# Patient Record
Sex: FEMALE | Race: WHITE | NOT HISPANIC OR LATINO | Employment: UNEMPLOYED | URBAN - METROPOLITAN AREA
[De-identification: names, ages, dates, MRNs, and addresses within clinical notes are randomized per-mention and may not be internally consistent; named-entity substitution may affect disease eponyms.]

---

## 2021-02-27 ENCOUNTER — HOSPITAL ENCOUNTER (EMERGENCY)
Facility: MEDICAL CENTER | Age: 26
End: 2021-02-27
Attending: EMERGENCY MEDICINE
Payer: COMMERCIAL

## 2021-02-27 ENCOUNTER — APPOINTMENT (OUTPATIENT)
Dept: RADIOLOGY | Facility: MEDICAL CENTER | Age: 26
End: 2021-02-27
Attending: EMERGENCY MEDICINE
Payer: COMMERCIAL

## 2021-02-27 VITALS
WEIGHT: 138.89 LBS | HEART RATE: 80 BPM | SYSTOLIC BLOOD PRESSURE: 148 MMHG | HEIGHT: 65 IN | TEMPERATURE: 96.9 F | OXYGEN SATURATION: 98 % | RESPIRATION RATE: 18 BRPM | BODY MASS INDEX: 23.14 KG/M2 | DIASTOLIC BLOOD PRESSURE: 94 MMHG

## 2021-02-27 DIAGNOSIS — S90.31XA CONTUSION OF RIGHT FOOT, INITIAL ENCOUNTER: ICD-10-CM

## 2021-02-27 PROCEDURE — 302874 HCHG BANDAGE ACE 2 OR 3""

## 2021-02-27 PROCEDURE — 73630 X-RAY EXAM OF FOOT: CPT | Mod: RT

## 2021-02-27 PROCEDURE — 99283 EMERGENCY DEPT VISIT LOW MDM: CPT

## 2021-02-27 ASSESSMENT — ENCOUNTER SYMPTOMS
DIZZINESS: 0
COUGH: 0
ABDOMINAL PAIN: 0
NECK PAIN: 0
CHILLS: 0
HEADACHES: 0
FEVER: 0
BACK PAIN: 0
SHORTNESS OF BREATH: 0

## 2021-02-27 NOTE — ED TRIAGE NOTES
Chief Complaint   Patient presents with   • Foot Pain     Right       Pt to triage via wheelchair c/o R foot pain after hitting it against a metal fence.  CMS intact    Pt & staff masked and in appropriate PPE during encounter.  Pt denies fever/travel or being in contact with anyone testing positive for Covid.  Explained pt the triage process, made pt aware to tell the RN/staff of any changes/concerns, pt verbalized understanding of process and instructions given.  Pt to ER lobby.

## 2021-02-27 NOTE — ED PROVIDER NOTES
ED Provider Note    CHIEF COMPLAINT  Chief Complaint   Patient presents with   • Foot Pain     Right       HPI  Latonya Acevedo is a 25 y.o. female with no significant medical history presented to the emergency department for evaluation of right foot pain.  Patient reports that she was attempting to break up dogs fighting when she ran her foot into a metal fence.  She struck the top of her foot on the fence causing immediate pain.  She reports that she is able to walk though it is quite painful.  She denies any numbness or tingling of the toes.  When she noted the swelling she came into the emergency department for further evaluation.  Event occurred just prior to arrival.  She did not take any medications.  She reports that the pain is 5/10, but worse with palpation of the area.    REVIEW OF SYSTEMS  Review of Systems   Constitutional: Negative for chills and fever.   Respiratory: Negative for cough and shortness of breath.    Cardiovascular: Negative for chest pain.   Gastrointestinal: Negative for abdominal pain.   Musculoskeletal: Negative for back pain and neck pain.        + right foot pain   Skin:        Negative for wound   Neurological: Negative for dizziness and headaches.   Endo/Heme/Allergies: Negative.        PAST MEDICAL HISTORY       SOCIAL HISTORY  Social History     Tobacco Use   • Smoking status: Current Every Day Smoker     Packs/day: 0.03     Types: Cigarettes   • Smokeless tobacco: Never Used   Substance and Sexual Activity   • Alcohol use: No   • Drug use: No   • Sexual activity: Yes     Comment: none       SURGICAL HISTORY   has a past surgical history that includes ercp in or (1/14/2015) and wilfredo by laparoscopy (1/13/2015).    CURRENT MEDICATIONS  Home Medications    **Home medications have not yet been reviewed for this encounter**         ALLERGIES  Allergies   Allergen Reactions   • Neosporin [Neomycin-Bacitracin-Polymyxin] Rash and Itching              PHYSICAL EXAM  VITAL SIGNS: BP  "152/102   Pulse 83   Temp 36.1 °C (96.9 °F) (Temporal)   Resp 18   Ht 1.651 m (5' 5\")   Wt 63 kg (138 lb 14.2 oz)   SpO2 97%   BMI 23.11 kg/m²    Pulse ox interpretation: I interpret this pulse ox as normal.    Physical Exam   Constitutional: She is oriented to person, place, and time and well-developed, well-nourished, and in no distress.   HENT:   Head: Normocephalic and atraumatic.   Mouth/Throat: Oropharynx is clear and moist.   Eyes: Conjunctivae are normal.   Cardiovascular: Normal rate, regular rhythm and intact distal pulses.   Pulmonary/Chest: Effort normal and breath sounds normal. No respiratory distress. She has no rales.   Musculoskeletal:         General: Normal range of motion.      Cervical back: Neck supple.      Right ankle: Swelling and ecchymosis present.      Comments: Dorsum of right foot with edema and tenderness present. Linear ecchymosis present transversely on the foot. Tender to palpation. Neurovascularly intact.   Neurological: She is alert and oriented to person, place, and time. GCS score is 15.   Skin: Skin is warm and dry.   Psychiatric: Affect and judgment normal.   Nursing note and vitals reviewed.        DIAGNOSTIC STUDIES    DX-FOOT-COMPLETE 3+ RIGHT   Final Result      No acute osseous abnormality.            COURSE & MEDICAL DECISION MAKING  Pertinent Labs & Imaging studies reviewed. (See chart for details)  I verified that the patient was wearing a mask and I was wearing appropriate PPE every time I entered the room. The patient's mask was on the patient at all times during my encounter except for a brief view of the oropharynx.     25-year-old female presents emergency department for evaluation of right foot pain after accidentally hitting her foot on a fence this evening.  On my examination she did have significant swelling and tenderness over the dorsum of the foot concerning for fracture.  X-ray was obtained showing no acute osseous abnormality.  Patient's foot was " wrapped with a compressive bandage and I advised RICE therapy for continued care.    The patient will return for new or worsening symptoms and is stable at the time of discharge.    The patient is referred to a primary physician for blood pressure management, diabetic screening, and for all other preventative health concerns.      DISPOSITION:  Patient will be discharged home in stable condition.    FOLLOW UP:  [facility], Emergency Dept  [facility] [facility] [id number] [phone]    As needed      OUTPATIENT MEDICATIONS:  Discharge Medication List as of 2/27/2021  2:21 AM          FINAL IMPRESSION  Visit Diagnoses     ICD-10-CM   1. Contusion of right foot, initial encounter  S90.31XA              Electronically signed by: Nara Parham M.D., 2/27/2021 1:18 AM

## 2021-08-06 PROCEDURE — 99284 EMERGENCY DEPT VISIT MOD MDM: CPT

## 2021-08-07 ENCOUNTER — HOSPITAL ENCOUNTER (EMERGENCY)
Facility: MEDICAL CENTER | Age: 26
End: 2021-08-07
Attending: EMERGENCY MEDICINE

## 2021-08-07 VITALS
OXYGEN SATURATION: 98 % | BODY MASS INDEX: 22.99 KG/M2 | SYSTOLIC BLOOD PRESSURE: 112 MMHG | WEIGHT: 138 LBS | DIASTOLIC BLOOD PRESSURE: 70 MMHG | TEMPERATURE: 98.6 F | HEIGHT: 65 IN | RESPIRATION RATE: 16 BRPM | HEART RATE: 87 BPM

## 2021-08-07 DIAGNOSIS — Z20.822 SUSPECTED COVID-19 VIRUS INFECTION: ICD-10-CM

## 2021-08-07 DIAGNOSIS — N12 PYELONEPHRITIS: ICD-10-CM

## 2021-08-07 LAB
ALBUMIN SERPL BCP-MCNC: 3.6 G/DL (ref 3.2–4.9)
ALBUMIN/GLOB SERPL: 1 G/DL
ALP SERPL-CCNC: 135 U/L (ref 30–99)
ALT SERPL-CCNC: 36 U/L (ref 2–50)
ANION GAP SERPL CALC-SCNC: 17 MMOL/L (ref 7–16)
APPEARANCE UR: CLEAR
AST SERPL-CCNC: 31 U/L (ref 12–45)
BACTERIA #/AREA URNS HPF: ABNORMAL /HPF
BASOPHILS # BLD AUTO: 0 % (ref 0–1.8)
BASOPHILS # BLD: 0 K/UL (ref 0–0.12)
BILIRUB SERPL-MCNC: 1 MG/DL (ref 0.1–1.5)
BILIRUB UR QL STRIP.AUTO: ABNORMAL
BUN SERPL-MCNC: 6 MG/DL (ref 8–22)
CALCIUM SERPL-MCNC: 9.4 MG/DL (ref 8.5–10.5)
CHLORIDE SERPL-SCNC: 91 MMOL/L (ref 96–112)
CO2 SERPL-SCNC: 24 MMOL/L (ref 20–33)
COLOR UR: YELLOW
CREAT SERPL-MCNC: 1.02 MG/DL (ref 0.5–1.4)
DOHLE BOD BLD QL SMEAR: NORMAL
EOSINOPHIL # BLD AUTO: 0 K/UL (ref 0–0.51)
EOSINOPHIL NFR BLD: 0 % (ref 0–6.9)
EPI CELLS #/AREA URNS HPF: ABNORMAL /HPF
ERYTHROCYTE [DISTWIDTH] IN BLOOD BY AUTOMATED COUNT: 39.5 FL (ref 35.9–50)
FLUAV RNA SPEC QL NAA+PROBE: NEGATIVE
FLUBV RNA SPEC QL NAA+PROBE: NEGATIVE
GLOBULIN SER CALC-MCNC: 3.6 G/DL (ref 1.9–3.5)
GLUCOSE SERPL-MCNC: 107 MG/DL (ref 65–99)
GLUCOSE UR STRIP.AUTO-MCNC: NEGATIVE MG/DL
HCG SERPL QL: NEGATIVE
HCT VFR BLD AUTO: 46.4 % (ref 37–47)
HGB BLD-MCNC: 16.1 G/DL (ref 12–16)
KETONES UR STRIP.AUTO-MCNC: 15 MG/DL
LEUKOCYTE ESTERASE UR QL STRIP.AUTO: ABNORMAL
LIPASE SERPL-CCNC: 23 U/L (ref 11–82)
LYMPHOCYTES # BLD AUTO: 1.57 K/UL (ref 1–4.8)
LYMPHOCYTES NFR BLD: 11.3 % (ref 22–41)
MANUAL DIFF BLD: NORMAL
MCH RBC QN AUTO: 31.5 PG (ref 27–33)
MCHC RBC AUTO-ENTMCNC: 34.7 G/DL (ref 33.6–35)
MCV RBC AUTO: 90.8 FL (ref 81.4–97.8)
MICRO URNS: ABNORMAL
MONOCYTES # BLD AUTO: 2.17 K/UL (ref 0–0.85)
MONOCYTES NFR BLD AUTO: 15.6 % (ref 0–13.4)
MORPHOLOGY BLD-IMP: NORMAL
NEUTROPHILS # BLD AUTO: 10.16 K/UL (ref 2–7.15)
NEUTROPHILS NFR BLD: 72.2 % (ref 44–72)
NEUTS BAND NFR BLD MANUAL: 0.9 % (ref 0–10)
NITRITE UR QL STRIP.AUTO: POSITIVE
NRBC # BLD AUTO: 0 K/UL
NRBC BLD-RTO: 0 /100 WBC
PH UR STRIP.AUTO: 5.5 [PH] (ref 5–8)
PLATELET # BLD AUTO: 302 K/UL (ref 164–446)
PLATELET BLD QL SMEAR: NORMAL
PMV BLD AUTO: 10.2 FL (ref 9–12.9)
POTASSIUM SERPL-SCNC: 3 MMOL/L (ref 3.6–5.5)
PROT SERPL-MCNC: 7.2 G/DL (ref 6–8.2)
PROT UR QL STRIP: 100 MG/DL
RBC # BLD AUTO: 5.11 M/UL (ref 4.2–5.4)
RBC # URNS HPF: ABNORMAL /HPF
RBC BLD AUTO: PRESENT
RBC UR QL AUTO: ABNORMAL
RENAL EPI CELLS #/AREA URNS HPF: ABNORMAL /HPF
RSV RNA SPEC QL NAA+PROBE: NEGATIVE
SARS-COV-2 RNA RESP QL NAA+PROBE: NOTDETECTED
SODIUM SERPL-SCNC: 132 MMOL/L (ref 135–145)
SP GR UR STRIP.AUTO: 1.02
SPECIMEN SOURCE: NORMAL
TOXIC GRANULES BLD QL SMEAR: NORMAL
TRANS CELLS #/AREA URNS HPF: NEGATIVE /HPF
UROBILINOGEN UR STRIP.AUTO-MCNC: 1 MG/DL
WBC # BLD AUTO: 13.9 K/UL (ref 4.8–10.8)
WBC #/AREA URNS HPF: ABNORMAL /HPF

## 2021-08-07 PROCEDURE — 84703 CHORIONIC GONADOTROPIN ASSAY: CPT

## 2021-08-07 PROCEDURE — 81001 URINALYSIS AUTO W/SCOPE: CPT

## 2021-08-07 PROCEDURE — 85027 COMPLETE CBC AUTOMATED: CPT

## 2021-08-07 PROCEDURE — 83690 ASSAY OF LIPASE: CPT

## 2021-08-07 PROCEDURE — 87086 URINE CULTURE/COLONY COUNT: CPT

## 2021-08-07 PROCEDURE — 700111 HCHG RX REV CODE 636 W/ 250 OVERRIDE (IP): Performed by: EMERGENCY MEDICINE

## 2021-08-07 PROCEDURE — 80053 COMPREHEN METABOLIC PANEL: CPT

## 2021-08-07 PROCEDURE — 85007 BL SMEAR W/DIFF WBC COUNT: CPT

## 2021-08-07 PROCEDURE — 87186 SC STD MICRODIL/AGAR DIL: CPT

## 2021-08-07 PROCEDURE — 700105 HCHG RX REV CODE 258: Performed by: EMERGENCY MEDICINE

## 2021-08-07 PROCEDURE — 96374 THER/PROPH/DIAG INJ IV PUSH: CPT

## 2021-08-07 PROCEDURE — 87077 CULTURE AEROBIC IDENTIFY: CPT

## 2021-08-07 PROCEDURE — 0241U HCHG SARS-COV-2 COVID-19 NFCT DS RESP RNA 4 TRGT MIC: CPT

## 2021-08-07 RX ORDER — ONDANSETRON 2 MG/ML
4 INJECTION INTRAMUSCULAR; INTRAVENOUS ONCE
Status: DISCONTINUED | OUTPATIENT
Start: 2021-08-07 | End: 2021-08-07 | Stop reason: HOSPADM

## 2021-08-07 RX ORDER — CEFDINIR 300 MG/1
300 CAPSULE ORAL 2 TIMES DAILY
Qty: 20 CAPSULE | Refills: 0 | Status: SHIPPED | OUTPATIENT
Start: 2021-08-07 | End: 2024-02-18

## 2021-08-07 RX ORDER — ONDANSETRON 4 MG/1
4 TABLET, ORALLY DISINTEGRATING ORAL EVERY 6 HOURS PRN
Qty: 10 TABLET | Refills: 0 | Status: SHIPPED | OUTPATIENT
Start: 2021-08-07 | End: 2021-08-12

## 2021-08-07 RX ORDER — SODIUM CHLORIDE 9 MG/ML
1000 INJECTION, SOLUTION INTRAVENOUS ONCE
Status: COMPLETED | OUTPATIENT
Start: 2021-08-07 | End: 2021-08-07

## 2021-08-07 RX ADMIN — SODIUM CHLORIDE 1000 ML: 9 INJECTION, SOLUTION INTRAVENOUS at 01:45

## 2021-08-07 RX ADMIN — CEFTRIAXONE SODIUM 1 G: 10 INJECTION, POWDER, FOR SOLUTION INTRAVENOUS at 03:07

## 2021-08-07 ASSESSMENT — PAIN DESCRIPTION - PAIN TYPE: TYPE: ACUTE PAIN

## 2021-08-07 NOTE — DISCHARGE INSTRUCTIONS
Your test results:  You have been tested for COVID-19 today. Your results are pending. Please act as if these results are positive and self isolate until you receive the results. Make sure you still wear a mask, social distance and practice good hand hygiene no matterthe result. In order to receive the results you will need to log into your mychart on the [employer] website. If you do not have an account you can create an account. You can login or create an account for [employer] at [email or web address].  Quarantine Criteria:  If your COVID test is positive self isolation at home is required.  Per the CDC guidelines, you must quarantine at home until the following conditions have been met:  It has been 10 days since the onset of symptoms  You have been fever free for 24 hours  Your symptoms are improving.     Self Care:  You need to get plenty of rest.   You should take Tylenol as needed for fever and body aches  Drink plenty of fluids and have good nutrition  Take over-the-counter immune supplements including: Vitamin C 500 mg twice a day, Zinc 75 mg daily, Vitamin D3 1000 U daily and melotonin 0.3 - 1 mg at night to help you sleep.      Community Care:  If you have no choice and have to go out to get medications or food, please wear a mask and wash your hands frequently.    Please tell everyone you know to wear a mask when in public to help decrease transmission of the virus.  Per CDC guidelines, you are not required to provide a healthcare provider’s note to validate your illness or to return to work, as healthcare provider offices and medical facilities may be extremely busy and not able to provide such documentation in a timely way.    Return to the ER Precautions:  Return to the ED if the is any significant shortness of breath, worsening symptoms, not improving as expected or you develop any concerning symptoms.   If your symptoms worsen to a point that you become so short of breath that you can only walk very short  distances prior to fatigue or feel you were unable to manage your symptoms at home please return to the emergency department. For a more objective approach you can buy a pulse oximeter online. [employer] has multiple to choose from. If your oxygen saturation in these devices is persistently below 90% please return to the ER.

## 2021-08-07 NOTE — LETTER
8/10/2021               Latonya Acevedo  [address] [place] [address]        Dear Latonya (MR#[id number])    This letter is sent in regards to your recent visit to the [facility] Emergency Department on 8/6/2021. During the visit, tests were performed to assist the physician in your medical diagnosis. A review of your tests requires that we notify you of the following:    Your urine culture was POSITIVE for a bacteria called Escherichia coli. The antibiotic prescribed for you (cefdinir) should be active to treat this bacteria. It is important that you continue taking your antibiotic until it is finished.     Please feel free to contact me at the number below if you have any questions or concerns. Thank you for your cooperation in the matter.    Sincerely,  ED Culture Follow-Up Staff  Brenda Curry, PharmD    [facility], Emergency Department  [address] [place] [id number] [phone] (Brenda's phone number)  [phone] (ED Culture Line)

## 2021-08-07 NOTE — ED TRIAGE NOTES
Chief Complaint   Patient presents with   • Fever   • Weakness   • N/V   • Diarrhea     Pt walked into triage c/o fever, chills, weakness with N/V/D x1 week.  No otc meds prior to arrival, no Covid Vaccine    PPE Note: I personally donned full PPE for all patient encounters during this visit, including a N95 respirator mask, gloves, and goggles.

## 2021-08-07 NOTE — ED PROVIDER NOTES
ER Provider Note     Scribed for Josesito Toure M.D. by Rupal Mendiola. 8/7/2021, 1:15 AM.    Primary Care Provider: Pcp Pt States None  Means of Arrival: Walk-in   History obtained from: Patient  History limited by: None     CHIEF COMPLAINT  Chief Complaint   Patient presents with    Fever    Weakness    N/V    Diarrhea       HPI  Latonya Acevedo is a 25 y.o. female who presents to the Emergency Department for evaluation of acute vomiting onset a week prior to arrival. She states that she has had persistent nausea and is unable to hold anything down. She endorses associated diarrhea, generalized weakness, fever, cold sweats, and fatigue. She is not vaccinated against COVID-19. There are no known alleviating or exacerbating factors.     REVIEW OF SYSTEMS  See HPI for further details. All other systems are negative.     PAST MEDICAL HISTORY       SURGICAL HISTORY   has a past surgical history that includes ercp in or (1/14/2015) and wilfredo by laparoscopy (1/13/2015).    SOCIAL HISTORY  Social History     Tobacco Use    Smoking status: Current Every Day Smoker     Packs/day: 0.03     Types: Cigarettes    Smokeless tobacco: Never Used   Substance Use Topics    Alcohol use: No    Drug use: No      Social History     Substance and Sexual Activity   Drug Use No       FAMILY HISTORY  Family History   Problem Relation Age of Onset    Asthma Father     Cancer Maternal Grandmother         breast cancer    Cancer Maternal Grandfather         lung Cancer    Asthma Paternal Grandfather     Heart Attack Paternal Grandfather        CURRENT MEDICATIONS  Current Outpatient Medications   Medication Instructions    ibuprofen (MOTRIN) 400 mg, Oral, EVERY 6 HOURS PRN    sucralfate (CARAFATE) 1 g, Oral, 4 TIMES DAILY - BEFORE MEALS , NIGHTLY       ALLERGIES  Allergies   Allergen Reactions    Neosporin [Neomycin-Bacitracin-Polymyxin] Rash and Itching              PHYSICAL EXAM  VITAL SIGNS: /68   Pulse 92   Temp 37 °C (98.6  "°F) (Oral)   Resp 16   Ht 1.651 m (5' 5\")   Wt 62.6 kg (138 lb)   SpO2 95%   BMI 22.96 kg/m²      Constitutional: Alert in no apparent distress.  HENT: No signs of trauma, Bilateral external ears normal, Nose normal.   Eyes: Pupils are equal and reactive, Conjunctiva normal, Non-icteric.   Neck: Normal range of motion, No tenderness, Supple, No stridor.   Lymphatic: No lymphadenopathy noted.   Cardiovascular: Regular rate and rhythm, no palpable thrill  Thorax & Lungs: No respiratory distress,  No chest tenderness.   Abdomen: Bowel sounds normal, Soft, No tenderness, No masses, No pulsatile masses. No peritoneal signs.  Skin: Warm, Dry, No erythema, No rash.   Back: No bony tenderness, No CVA tenderness.   Extremities: Intact distal pulses, No edema, No tenderness, No cyanosis.  Musculoskeletal: Good range of motion in all major joints. No tenderness to palpation or major deformities noted.   Neurologic: Alert , Normal motor function, Normal sensory function, No focal deficits noted.   Psychiatric: Affect normal, Judgment normal, Mood normal.     DIAGNOSTIC STUDIES / PROCEDURES    LABS  Labs Reviewed   CBC WITH DIFFERENTIAL - Abnormal; Notable for the following components:       Result Value    WBC 13.9 (*)     Hemoglobin 16.1 (*)     Neutrophils-Polys 72.20 (*)     Lymphocytes 11.30 (*)     Monocytes 15.60 (*)     Neutrophils (Absolute) 10.16 (*)     Monos (Absolute) 2.17 (*)     All other components within normal limits   COMP METABOLIC PANEL - Abnormal; Notable for the following components:    Sodium 132 (*)     Potassium 3.0 (*)     Chloride 91 (*)     Anion Gap 17.0 (*)     Glucose 107 (*)     Bun 6 (*)     Alkaline Phosphatase 135 (*)     Globulin 3.6 (*)     All other components within normal limits   URINALYSIS,CULTURE IF INDICATED - Abnormal; Notable for the following components:    Ketones 15 (*)     Protein 100 (*)     Bilirubin Moderate (*)     Nitrite Positive (*)     Leukocyte Esterase Moderate " (*)     Occult Blood Moderate (*)     All other components within normal limits    Narrative:     Indication for culture:->Patient WITHOUT an indwelling Jefferson  catheter in place with new onset of Dysuria, Frequency,  Urgency, and/or Suprapubic pain   URINE MICROSCOPIC (W/UA) - Abnormal; Notable for the following components:    WBC  (*)     RBC 5-10 (*)     Bacteria Moderate (*)     Epithelial Cells Moderate (*)     All other components within normal limits    Narrative:     Indication for culture:->Patient WITHOUT an indwelling Jefferson  catheter in place with new onset of Dysuria, Frequency,  Urgency, and/or Suprapubic pain   LIPASE   HCG QUAL SERUM   COV-2, FLU A/B, AND RSV BY PCR    Narrative:     Have you been in close contact with a person who is suspected  or known to be positive for COVID-19 within the last 30 days  (e.g. last seen that person < 30 days ago)->Unknown   URINE CULTURE(NEW)    Narrative:     Indication for culture:->Patient WITHOUT an indwelling Jefferson  catheter in place with new onset of Dysuria, Frequency,  Urgency, and/or Suprapubic pain   ESTIMATED GFR   DIFFERENTIAL MANUAL   PERIPHERAL SMEAR REVIEW   PLATELET ESTIMATE   MORPHOLOGY     All labs reviewed by me.    COURSE & MEDICAL DECISION MAKING  Pertinent Labs & Imaging studies reviewed. (See chart for details)    This is a 25 y.o. female that presents with nausea as well as vomiting and generally feeling unwell.  Concern about urinary tract infection versus pancreatitis versus hepatitis versus pregnancy and will reassess after the below labs with been performed..     1:15 AM - Patient seen and examined at bedside. Ordered CBC with differential, CMP, Lipase, HCG Qual Serum, and UA. Patient will be treated with IV fluids and Zofran for her symptoms.    2:24 AM - Patient treated with Rocephin.    2:51 AM - Patient has pyelonephritis and suspected COVID-19. She will be prescribed Zofran and Omnicef and discharged in stable condition.    The  patient will return for new or worsening symptoms and is stable at the time of discharge.    The patient is referred to a primary physician for blood pressure management, diabetic screening, and for all other preventative health concerns.    Patient was found to have a UTI.  Given the constellation of symptoms I believe this is likely pyelonephritis.  I will treat her with ceftriaxone here in the emergency department she does have a leukocytosis.  She does have an anion gap of 17.  I will give her nausea medication.  The patient is not pregnant and has a negative lipase.  We will discharge the patient home with strict return precautions and follow-up.    DISPOSITION:  Patient will be discharged home in stable condition.    FOLLOW UP:  [address] [id number] [phone]  Go in 2 days      OUTPATIENT MEDICATIONS:  Discharge Medication List as of 8/7/2021  2:52 AM        START taking these medications    Details   cefdinir (OMNICEF) 300 MG Cap Take 1 capsule by mouth 2 times a day., Disp-20 capsule, R-0, Normal      ondansetron (ZOFRAN ODT) 4 MG TABLET DISPERSIBLE Take 1 tablet by mouth every 6 hours as needed for Nausea for up to 5 days., Disp-10 tablet, R-0, Normal             FINAL IMPRESSION  1. Pyelonephritis    2. Suspected COVID-19 virus infection          Rupal BATRES (Anam), am scribing for, and in the presence of, Josesito Toure M.D..    Electronically signed by: Rupal Nicolas), 8/7/2021    IJosesito M.D. personally performed the services described in this documentation, as scribed by Rupal Mendiola in my presence, and it is both accurate and complete.     The note accurately reflects work and decisions made by me.  Josesito Toure M.D.  8/7/2021  4:10 AM

## 2021-08-09 LAB
BACTERIA UR CULT: ABNORMAL
BACTERIA UR CULT: ABNORMAL
SIGNIFICANT IND 70042: ABNORMAL
SITE SITE: ABNORMAL
SOURCE SOURCE: ABNORMAL

## 2021-08-11 NOTE — ED NOTES
"ED Positive Culture Follow-up/Notification Note:    Date: 8/10/2021     Patient seen in the ED on 8/6/2021 for fever, N/V, and diarrhea. Patient received ceftriaxone 1 g IV x1 in the ED.  1. Pyelonephritis    2. Suspected COVID-19 virus infection       Discharge Medication List as of 8/7/2021  2:52 AM      START taking these medications    Details   cefdinir (OMNICEF) 300 MG Cap Take 1 capsule by mouth 2 times a day., Disp-20 capsule, R-0, Normal      ondansetron (ZOFRAN ODT) 4 MG TABLET DISPERSIBLE Take 1 tablet by mouth every 6 hours as needed for Nausea for up to 5 days., Disp-10 tablet, R-0, Normal             Allergies: Neosporin [neomycin-bacitracin-polymyxin]     Vitals:    08/06/21 2227 08/06/21 2230 08/07/21 0100 08/07/21 0254   BP: 115/77  104/68 112/70   Pulse: (!) 113  92 87   Resp: 16   16   Temp: 37 °C (98.6 °F)      TempSrc: Oral      SpO2: 95%  95% 98%   Weight:  62.6 kg (138 lb)     Height: 1.651 m (5' 5\") 1.651 m (5' 5\")         Final cultures:   Results     Procedure Component Value Units Date/Time    URINE CULTURE(NEW) [[id number]]  (Abnormal)  (Susceptibility) Collected: 08/07/21 0141    Order Status: Completed Specimen: Urine Updated: 08/09/21 1035     Significant Indicator POS     Source UR     Site -     Culture Result Usual skin kyra 10-50,000 cfu/mL      Escherichia coli  >100,000 cfu/mL      Narrative:      Indication for culture:->Patient WITHOUT an indwelling Jefferson  catheter in place with new onset of Dysuria, Frequency,  Urgency, and/or Suprapubic pain    Susceptibility     Escherichia coli (1)     Antibiotic Interpretation Microscan Method Status    Amikacin  [*]  Sensitive <=16 mcg/mL JD Final    Ampicillin Resistant >16 mcg/mL JD Final    Amoxicillin/CA  [*]  Sensitive <=8/4 mcg/mL JD Final    Aztreonam  [*]  Sensitive <=4 mcg/mL JD Final    Ceftolozane+Tazobactam  [*]  Sensitive <=2 mcg/mL JD Final    Ceftriaxone Sensitive <=1 mcg/mL JD Final    Ceftazidime  [*]  Sensitive <=1 " "mcg/mL JD Final    Cefazolin Sensitive <=2 mcg/mL JD Final    Ciprofloxacin Sensitive <=0.25 mcg/mL JD Final    Cefepime Sensitive <=2 mcg/mL JD Final    Cefuroxime Sensitive <=4 mcg/mL JD Final    Ampicillin/sulbactam Intermediate 16/8 mcg/mL JD Final    Ceftazidime+Avibactam  [*]  Sensitive <=4 mcg/mL JD Final    Tobramycin Sensitive <=2 mcg/mL JD Final    Ertapenem  [*]  Sensitive <=0.5 mcg/mL JD Final    Nitrofurantoin Sensitive <=32 mcg/mL JD Final    Gentamicin Sensitive <=2 mcg/mL JD Final    Imipenem  [*]  Sensitive <=1 mcg/mL JD Final    Levofloxacin Sensitive <=0.5 mcg/mL JD Final    Meropenem  [*]  Sensitive <=1 mcg/mL JD Final    Meropenem/Vaborbactam  [*]  Sensitive <=2 mcg/mL JD Final    Pip/Tazobactam Sensitive <=8 mcg/mL JD Final    Trimeth/Sulfa Sensitive <=0.5/9.5 mcg/mL JD Final    Tetracycline  [*]  Sensitive <=4 mcg/mL JD Final    Tigecycline Sensitive <=2 mcg/mL JD Final           [*]  Suppressed Antibiotic                 COV-2, FLU A/B, AND RSV BY PCR (2-4 HOURS [employer]ID): Collect NP swab in [facility] [[id number]] Collected: 08/07/21 0141    Order Status: Completed Specimen: Respirate from Nasopharyngeal Updated: 08/07/21 0318     Influenza virus A RNA Negative     Influenza virus B, PCR Negative     RSV, PCR Negative     SARS-CoV-2 by PCR NotDetected     Comment: PATIENTS: Important information regarding your results and instructions can  be found at [email or web address]   \"After your  Covid-19 Test\"    [facility] providers: PLEASE REFER TO DE-ESCALATION AND RETESTING PROTOCOL  on [email or web address]    **The [employer] GeneXpert Xpress SARS-CoV-2 RT-PCR Test has been made  available for use under the Emergency Use Authorization (EUA) only.          SARS-CoV-2 Source NP Swab    Narrative:      Have you been in close contact with a person who is suspected  or known to be positive for COVID-19 within the last 30 days  (e.g. last seen that person < 30 days " ago)->Unknown    URINALYSIS,CULTURE IF INDICATED [[id number]]  (Abnormal) Collected: 08/07/21 0141    Order Status: Completed Specimen: Urine Updated: 08/07/21 0221     Color Yellow     Character Clear     Specific Gravity 1.020     Ph 5.5     Glucose Negative mg/dL      Ketones 15 mg/dL      Protein 100 mg/dL      Bilirubin Moderate     Urobilinogen, Urine 1.0     Nitrite Positive     Leukocyte Esterase Moderate     Occult Blood Moderate     Micro Urine Req Microscopic    Narrative:      Indication for culture:->Patient WITHOUT an indwelling Jefferson  catheter in place with new onset of Dysuria, Frequency,  Urgency, and/or Suprapubic pain          Plan:   Appropriate antibiotic therapy prescribed. No changes required based upon culture result. Attempted to call patient to check on clinical status, but no answer so LVM. Sent letter to patient to notify of positive culture result and encourage compliance with prescribed antibiotics.     Brenda Curry, PharmD  PGY2 Infectious Diseases Pharmacy Resident

## 2024-02-18 ENCOUNTER — APPOINTMENT (OUTPATIENT)
Dept: URGENT CARE | Facility: CLINIC | Age: 29
End: 2024-02-18
Payer: COMMERCIAL

## 2024-02-18 ENCOUNTER — OFFICE VISIT (OUTPATIENT)
Dept: URGENT CARE | Facility: CLINIC | Age: 29
End: 2024-02-18
Payer: COMMERCIAL

## 2024-02-18 VITALS
RESPIRATION RATE: 18 BRPM | HEART RATE: 108 BPM | WEIGHT: 207.4 LBS | SYSTOLIC BLOOD PRESSURE: 120 MMHG | TEMPERATURE: 97.7 F | BODY MASS INDEX: 34.55 KG/M2 | DIASTOLIC BLOOD PRESSURE: 88 MMHG | OXYGEN SATURATION: 96 % | HEIGHT: 65 IN

## 2024-02-18 DIAGNOSIS — F17.200 TOBACCO DEPENDENCY: ICD-10-CM

## 2024-02-18 DIAGNOSIS — J02.9 SORE THROAT: ICD-10-CM

## 2024-02-18 DIAGNOSIS — R05.1 ACUTE COUGH: ICD-10-CM

## 2024-02-18 DIAGNOSIS — R59.1 LYMPHADENOPATHY: ICD-10-CM

## 2024-02-18 DIAGNOSIS — R06.02 SHORTNESS OF BREATH: ICD-10-CM

## 2024-02-18 DIAGNOSIS — J02.0 STREP PHARYNGITIS: ICD-10-CM

## 2024-02-18 DIAGNOSIS — J40 BRONCHITIS: ICD-10-CM

## 2024-02-18 LAB
FLUAV RNA SPEC QL NAA+PROBE: NEGATIVE
FLUBV RNA SPEC QL NAA+PROBE: NEGATIVE
RSV RNA SPEC QL NAA+PROBE: NEGATIVE
S PYO DNA SPEC NAA+PROBE: DETECTED
SARS-COV-2 RNA RESP QL NAA+PROBE: NEGATIVE

## 2024-02-18 PROCEDURE — 0241U POCT CEPHEID COV-2, FLU A/B, RSV - PCR: CPT | Performed by: PHYSICIAN ASSISTANT

## 2024-02-18 PROCEDURE — 99203 OFFICE O/P NEW LOW 30 MIN: CPT | Performed by: PHYSICIAN ASSISTANT

## 2024-02-18 PROCEDURE — 87651 STREP A DNA AMP PROBE: CPT | Performed by: PHYSICIAN ASSISTANT

## 2024-02-18 PROCEDURE — 3079F DIAST BP 80-89 MM HG: CPT | Performed by: PHYSICIAN ASSISTANT

## 2024-02-18 PROCEDURE — 3074F SYST BP LT 130 MM HG: CPT | Performed by: PHYSICIAN ASSISTANT

## 2024-02-18 RX ORDER — ALBUTEROL SULFATE 90 UG/1
1-2 AEROSOL, METERED RESPIRATORY (INHALATION) EVERY 4 HOURS PRN
Qty: 1 EACH | Refills: 0 | Status: SHIPPED | OUTPATIENT
Start: 2024-02-18

## 2024-02-18 RX ORDER — AMOXICILLIN 500 MG/1
500 CAPSULE ORAL 2 TIMES DAILY
Qty: 20 CAPSULE | Refills: 0 | Status: SHIPPED | OUTPATIENT
Start: 2024-02-18 | End: 2024-02-28

## 2024-02-18 RX ORDER — METHYLPREDNISOLONE 4 MG/1
TABLET ORAL
Qty: 21 TABLET | Refills: 0 | Status: SHIPPED | OUTPATIENT
Start: 2024-02-18

## 2024-02-18 NOTE — PATIENT INSTRUCTIONS
Symptomatic and supportive care:   Plenty of oral hydration and rest   Over the counter cough suppressant as directed.  Tylenol or ibuprofen for pain and fever as directed.   Warm salt water gargles for sore throat, soft foods, cool liquids.   Saline nasal spray, Flonase, and/or otc sudafed (if no history of hypertension) as a decongestant.   Infection control measures at home. Stay away from people, Hand washing, covering sneeze/cough, disinfect surfaces.   Remain home from work, school, and other populated environments while ill.  Overall, the patient is well-appearing. They are not hypoxic, afebrile, and a normal pulmonary exam.

## 2024-02-18 NOTE — PROGRESS NOTES
"Subjective:   Latonya Acevedo is a 28 y.o. female who presents for Cough and Sore Throat (Patient states swollen lymphs, cough and sore throat)   Right sided ike x 2 days  Subjective fevers, chills  Viral uri sx x 5 days  Loss of voice 4 days  Coughing  Smoker  Mild pain with swallowing, not severe  Mild ear congestion  No known sick contacts  Visiting grandparents soon, concerned about being infectious  Cough productive of phlegm, thick      Medications:  cefdinir Caps  ibuprofen Tabs  sucralfate Tabs    Allergies:             Neosporin [neomycin-bacitracin-polymyxin]    Surgical History:         Past Surgical History:   Procedure Laterality Date    ERCP IN OR  1/14/2015    Performed by Fawad Childress M.D. at SURGERY [facility]    EDGARDO BY LAPAROSCOPY  1/13/2015    Procedure: with grams  ;  Surgeon: Dawson Cuevas M.D.;  Location: SURGERY [facility];  Service:        Past Social Hx:  Latonya Acevedo  reports that she has been smoking cigarettes. She has never used smokeless tobacco. She reports that she does not drink alcohol and does not use drugs.     Past Family Hx:   Latonya Acevedo family history includes Asthma in her father and paternal grandfather; Cancer in her maternal grandfather and maternal grandmother; Heart Attack in her paternal grandfather.       Problem list, medications, and allergies reviewed by myself today in Epic.     Objective:     /88 (BP Location: Left arm, Patient Position: Sitting, BP Cuff Size: Adult)   Pulse (!) 108   Temp 36.5 °C (97.7 °F) (Temporal)   Resp 18   Ht 1.651 m (5' 5\")   Wt 94.1 kg (207 lb 6.4 oz)   SpO2 96%   BMI 34.51 kg/m²     Physical Exam  Vitals and nursing note reviewed.   Constitutional:       General: She is not in acute distress.     Appearance: Normal appearance. She is well-developed. She is not ill-appearing or diaphoretic.   HENT:      Head: Normocephalic.      Right Ear: Tympanic membrane normal.      Left Ear: " Tympanic membrane normal.      Nose: Congestion and rhinorrhea present.      Mouth/Throat:      Palate: No mass.      Pharynx: Posterior oropharyngeal erythema present. No pharyngeal swelling, oropharyngeal exudate or uvula swelling.      Tonsils: No tonsillar exudate or tonsillar abscesses. 1+ on the right. 1+ on the left.   Eyes:      Conjunctiva/sclera: Conjunctivae normal.      Pupils: Pupils are equal, round, and reactive to light.   Neck:        Comments: Right-sided tender cervical lymphadenopathy is noted.  Patient speaking in full sentences.  No difficulty breathing or swallowing.  Cardiovascular:      Rate and Rhythm: Normal rate and regular rhythm.      Pulses: Normal pulses.      Heart sounds: No murmur heard.  Pulmonary:      Effort: Pulmonary effort is normal. No tachypnea, accessory muscle usage or respiratory distress.      Breath sounds: Normal breath sounds. No stridor. No decreased breath sounds, wheezing, rhonchi or rales.      Comments: Lungs are clear to auscultation bilaterally without rhonchi rales or wheezes  Musculoskeletal:         General: Normal range of motion.      Cervical back: Normal range of motion and neck supple.   Lymphadenopathy:      Cervical: Cervical adenopathy present.      Right cervical: Superficial cervical adenopathy present.   Skin:     General: Skin is warm and dry.   Neurological:      Mental Status: She is alert and oriented to person, place, and time.   Psychiatric:         Behavior: Behavior is cooperative.       Results for orders placed or performed in visit on 02/18/24   POCT CEPHEID COV-2, FLU A/B, RSV - PCR   Result Value Ref Range    SARS-CoV-2 by PCR Negative Negative, Invalid    Influenza virus A RNA Negative Negative, Invalid    Influenza virus B, PCR Negative Negative, Invalid    RSV, PCR Negative Negative, Invalid   POCT CEPHEID GROUP A STREP - PCR   Result Value Ref Range    POC Group A Strep, PCR Detected (A) Not Detected, Invalid        Assessment/Plan:     Diagnosis and Associated Orders:     1. Sore throat  - POCT CEPHEID COV-2, FLU A/B, RSV - PCR  - POCT CEPHEID GROUP A STREP - PCR    2. Lymphadenopathy  - POCT CEPHEID GROUP A STREP - PCR    3. Acute cough  - POCT CEPHEID COV-2, FLU A/B, RSV - PCR    4. Shortness of breath  - albuterol 108 (90 Base) MCG/ACT Aero Soln inhalation aerosol; Inhale 1-2 Puffs every four hours as needed for Shortness of Breath.  Dispense: 1 Each; Refill: 0  - methylPREDNISolone (MEDROL DOSEPAK) 4 MG Tablet Therapy Pack; Follow schedule on package instructions.  Dispense: 21 Tablet; Refill: 0    5. Bronchitis  - methylPREDNISolone (MEDROL DOSEPAK) 4 MG Tablet Therapy Pack; Follow schedule on package instructions.  Dispense: 21 Tablet; Refill: 0    6. Strep pharyngitis  - amoxicillin (AMOXIL) 500 MG Cap; Take 1 Capsule by mouth 2 times a day for 10 days.  Dispense: 20 Capsule; Refill: 0    7. Tobacco dependency        Comments/MDM:  POSITIVE Strep A.  Likely explains because of cervical lymphadenopathy.  Patient does have ongoing cough with history of ongoing tobacco use.  She has had a chronic cough in this regard which is also exacerbated with current symptoms.  She is experiencing shortness of breath at times.  Will treat for bronchitis with albuterol and Medrol Dosepak.  Adverse effects of steroids discussed.  Discussed antibiotic treatment for full 10 days, salt water gargles, soft foods, cool liquids, ibuprofen and Tylenol for any pain or fevers. Switch out your toothbrush for a new toothbrush in 2-3 days time.  You will be contagious for 24 hours after initiation of antibiotis.   Return to the urgent care if symptoms are not improving or any worsening symptoms or concerns. Present to the emergency room or call 911 if any severe swelling of the throat, difficulty swallowing, difficulty breathing, wheezing, or any other severe concerns.             .       The patient's presenting symptoms and exam findings  most likely are due to a viral etiology.  Vital signs stable and reassuring.  Lungs are clear to auscultation bilaterally.  Patient is not hypoxic.  They are afebrile.  They are nontachypneic.  No indication for antibiotics at this time.  Did discuss signs, symptoms, and timing of secondary bacterial infection and indications for return visit.    Symptomatic and supportive care:   Plenty of oral hydration and rest   Over the counter cough suppressant as directed.  Tylenol or ibuprofen for pain and fever as directed.   Warm salt water gargles for sore throat, soft foods, cool liquids.   Saline nasal spray, Flonase, and/or otc sudafed (if no history of hypertension) as a decongestant.   Infection control measures at home. Stay away from people, Hand washing, covering sneeze/cough, disinfect surfaces.   Remain home from work, school, and other populated environments while ill.  Overall, the patient is well-appearing. They are not hypoxic, afebrile, and a normal pulmonary exam.      Patient should to proceed to ED for development of symptoms including but not limited to shortness of breath breath, respiratory distress, increased fever, persistent symptoms, or worsening symptoms not manageable at home.    I personally reviewed prior external notes and test results pertinent to today's visit. Supportive care, natural history, differential diagnoses, and indications for immediate follow-up discussed. Return to clinic or go to ED if symptoms worsen or persist.  Red flag symptoms discussed.  Patient/Parent/Guardian voices understanding. Follow-up with your primary care provider in 3-5 days.  All side effects of medication discussed including allergic response, GI upset, tendon injury, rash, sedation etc    Please note that this dictation was created using voice recognition software. I have made a reasonable attempt to correct obvious errors, but I expect that there are errors of grammar and possibly content that I did not  discover before finalizing the note.    This note was electronically signed by Lindsey Good PA-C

## 2024-02-19 NOTE — PROGRESS NOTES
Patient's cepheid is positive for strep pharyngitis.   MARIANA Good is out of the office at this time.   Amoxil sent to [facility] [address].